# Patient Record
Sex: FEMALE | URBAN - METROPOLITAN AREA
[De-identification: names, ages, dates, MRNs, and addresses within clinical notes are randomized per-mention and may not be internally consistent; named-entity substitution may affect disease eponyms.]

---

## 2020-04-16 ENCOUNTER — OFFICE VISIT (OUTPATIENT)
Dept: URBAN - METROPOLITAN AREA CLINIC 48 | Facility: CLINIC | Age: 40
End: 2020-04-16
Payer: COMMERCIAL

## 2020-04-16 DIAGNOSIS — H53.2 DIPLOPIA: Primary | ICD-10-CM

## 2020-04-16 PROCEDURE — 92004 COMPRE OPH EXAM NEW PT 1/>: CPT | Performed by: OPTOMETRIST

## 2020-04-16 ASSESSMENT — INTRAOCULAR PRESSURE
OD: 18
OS: 18

## 2020-04-16 NOTE — IMPRESSION/PLAN
Impression: Diplopia: H53.2. Patient states double vision happened 6 weeks ago and lasted for 2 weeks. She is not experiencing it today. Did Finn Freeman 3 step to measure phorias, possible trace 4th nerve palsy OS Possible relative ptosis OS and tone loss on left side of face, will check for ptosis tomorrow before dilation Possible tr papilledema
hearing loss left ear Plan: Discussed diagnosis with patient. Possibly IIHT vs other neurological problem RTC tomorrow with Dr. Emre Joshi at [facility], check pupils, eyelids before Dilation.  Consider field test, neuro testing and nerve photo

## 2020-04-17 ENCOUNTER — OFFICE VISIT (OUTPATIENT)
Dept: URBAN - METROPOLITAN AREA CLINIC 48 | Facility: CLINIC | Age: 40
End: 2020-04-17
Payer: COMMERCIAL

## 2020-04-17 PROCEDURE — 99214 OFFICE O/P EST MOD 30 MIN: CPT | Performed by: OPHTHALMOLOGY

## 2020-04-17 PROCEDURE — 92133 CPTRZD OPH DX IMG PST SGM ON: CPT | Performed by: OPHTHALMOLOGY

## 2020-04-17 RX ORDER — ACETAZOLAMIDE 250 MG/1
250 MG TABLET ORAL
Qty: 60 | Refills: 0 | Status: ACTIVE
Start: 2020-04-17

## 2020-04-17 ASSESSMENT — INTRAOCULAR PRESSURE
OS: 15
OD: 16

## 2020-04-17 NOTE — IMPRESSION/PLAN
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 Full to finger count in all clock hours in 

ACT Right head tilt 2RHT Ortho in all other gazes. Patient states  easier to look at things to left. Patient rests with  a small left  head tilt Plan: Vague complaints in seeing better in different head positions. Patient has history of headaches for  a long time now. Rule out tumor,  sinus thrombosis. Start Diamox 250 mg tid PO Discussed losing 20 % of body weight. Imaging ordered: MRI of the brain with and with out contrast
MR venogram of brain LABS: BUN and Creatinine RTC  Next week follow up  with VF 24-2 and Ultrasound B, color vision  ( long exam)

## 2020-04-23 ENCOUNTER — OFFICE VISIT (OUTPATIENT)
Dept: URBAN - METROPOLITAN AREA CLINIC 48 | Facility: CLINIC | Age: 40
End: 2020-04-23
Payer: COMMERCIAL

## 2020-04-23 PROCEDURE — 92012 INTRM OPH EXAM EST PATIENT: CPT | Performed by: OPHTHALMOLOGY

## 2020-04-23 PROCEDURE — 92083 EXTENDED VISUAL FIELD XM: CPT | Performed by: OPHTHALMOLOGY

## 2020-04-23 RX ORDER — FUROSEMIDE 40 MG/1
40 MG TABLET ORAL
Qty: 60 | Refills: 0 | Status: INACTIVE
Start: 2020-04-23 | End: 2020-05-13

## 2020-04-23 ASSESSMENT — INTRAOCULAR PRESSURE
OD: 16
OS: 16

## 2020-04-23 NOTE — IMPRESSION/PLAN
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 Patient has had a bad reaction to Topomax. Discontinue Diamox Start Furosemide 40 mg bid PO Refer to  Dr. Ferdinand Zuniga  due to  Heart Problems when taking Diamox and VF defect close to [address]. Refer to [facility]  in 2-3 weeks Orders:
Lumbar puncture with opening pressure WBC's, RBC's and Protein RTC 2 weeks follow up with VF 24-2 repeat  and OCt nerve Prior ( long exam)

## 2020-05-06 ENCOUNTER — TESTING ONLY (OUTPATIENT)
Dept: URBAN - METROPOLITAN AREA CLINIC 48 | Facility: CLINIC | Age: 40
End: 2020-05-06
Payer: COMMERCIAL

## 2020-05-06 PROCEDURE — 92083 EXTENDED VISUAL FIELD XM: CPT | Performed by: OPHTHALMOLOGY

## 2020-05-13 ENCOUNTER — OFFICE VISIT (OUTPATIENT)
Dept: URBAN - METROPOLITAN AREA CLINIC 48 | Facility: CLINIC | Age: 40
End: 2020-05-13
Payer: COMMERCIAL

## 2020-05-13 DIAGNOSIS — H47.11 PAPILLEDEMA ASSOCIATED WITH INCREASED INTRACRANIAL PRESSURE: Primary | ICD-10-CM

## 2020-05-13 PROCEDURE — 92133 CPTRZD OPH DX IMG PST SGM ON: CPT | Performed by: OPHTHALMOLOGY

## 2020-05-13 PROCEDURE — 92012 INTRM OPH EXAM EST PATIENT: CPT | Performed by: OPHTHALMOLOGY

## 2020-05-13 RX ORDER — FUROSEMIDE 40 MG/1
40 MG TABLET ORAL
Qty: 60 | Refills: 0 | Status: INACTIVE
Start: 2020-05-13 | End: 2020-05-15

## 2020-05-13 RX ORDER — FUROSEMIDE 80 MG/1
80 MG TABLET ORAL
Qty: 60 | Refills: 0 | Status: INACTIVE
Start: 2020-05-13 | End: 2020-05-13

## 2020-05-13 ASSESSMENT — INTRAOCULAR PRESSURE
OS: 17
OD: 16

## 2020-05-21 ENCOUNTER — OFFICE VISIT (OUTPATIENT)
Dept: URBAN - METROPOLITAN AREA CLINIC 48 | Facility: CLINIC | Age: 40
End: 2020-05-21
Payer: COMMERCIAL

## 2020-05-21 PROCEDURE — 99214 OFFICE O/P EST MOD 30 MIN: CPT | Performed by: OPHTHALMOLOGY

## 2020-05-21 ASSESSMENT — INTRAOCULAR PRESSURE
OD: 15
OS: 15

## 2020-05-21 NOTE — IMPRESSION/PLAN
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 MRI of the Brain 04/20/202 MR venogram  04/20/20 Ishiharas color test 17/17 OU 04/23/2020 Plan: VF results OU : enlarged blind spot  and concern for VF loss. Suspect increased  intracranial pressure hypertension, patient has persistent significant papilledema in both eyes, and VF loss. Patient reports 10lbs weight loss is a healthy manner, continue healthy  weight loss Patient has had a bad reaction to Topomax. Discontinue Diamox Continue  Furosemide 40 mg bid PO until PCP is wanting to increase it. Patient to keep all appointments with Dr. Juan Coffman, Neurologist and PCP. RTC 3  weeks follow up with OCT nerve ( long exam)

## 2024-11-07 NOTE — IMPRESSION/PLAN
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 MRI of the Brain 04/20/202 MR venogram  04/20/20 Ishiharas color test 17/17 OU 04/23/2020 Plan: VF results OU : enlarged blind spot  and concern for VF loss. Suspect increased  intracranial pressure hypertension, patient has persistent significant papilledema in both eyes, and VF loss. Patient has had a bad reaction to Topomax. Discontinue Diamox Continue  Furosemide 40 mg bid PO Refer to  Dr. John Hung  due to  Heart Problems when taking Diamox and VF defect close to [address] [place] [address]. We have Auth on file to see Dr. John Hung Orders given 04/23/2020 but have not been scheduled Lumbar puncture with opening pressure WBC's, RBC's and Protein RTC 1 week follow up ( long exam)
1 (mild pain)